# Patient Record
Sex: MALE | Race: WHITE | Employment: OTHER | URBAN - METROPOLITAN AREA
[De-identification: names, ages, dates, MRNs, and addresses within clinical notes are randomized per-mention and may not be internally consistent; named-entity substitution may affect disease eponyms.]

---

## 2023-06-09 LAB
ALANINE AMINOTRANSFERASE (SGPT) (U/L) IN SER/PLAS: 21 U/L (ref 10–52)
ALBUMIN (G/DL) IN SER/PLAS: 4.5 G/DL (ref 3.4–5)
ALKALINE PHOSPHATASE (U/L) IN SER/PLAS: 39 U/L (ref 33–136)
ANION GAP IN SER/PLAS: 12 MMOL/L (ref 10–20)
ASPARTATE AMINOTRANSFERASE (SGOT) (U/L) IN SER/PLAS: 21 U/L (ref 9–39)
BILIRUBIN TOTAL (MG/DL) IN SER/PLAS: 0.6 MG/DL (ref 0–1.2)
CALCIUM (MG/DL) IN SER/PLAS: 9.7 MG/DL (ref 8.6–10.6)
CARBON DIOXIDE, TOTAL (MMOL/L) IN SER/PLAS: 26 MMOL/L (ref 21–32)
CHLORIDE (MMOL/L) IN SER/PLAS: 107 MMOL/L (ref 98–107)
CHOLESTEROL (MG/DL) IN SER/PLAS: 143 MG/DL (ref 0–199)
CHOLESTEROL IN HDL (MG/DL) IN SER/PLAS: 37.9 MG/DL
CHOLESTEROL/HDL RATIO: 3.8
CREATININE (MG/DL) IN SER/PLAS: 1.02 MG/DL (ref 0.5–1.3)
ERYTHROCYTE DISTRIBUTION WIDTH (RATIO) BY AUTOMATED COUNT: 13.1 % (ref 11.5–14.5)
ERYTHROCYTE MEAN CORPUSCULAR HEMOGLOBIN CONCENTRATION (G/DL) BY AUTOMATED: 32.8 G/DL (ref 32–36)
ERYTHROCYTE MEAN CORPUSCULAR VOLUME (FL) BY AUTOMATED COUNT: 105 FL (ref 80–100)
ERYTHROCYTES (10*6/UL) IN BLOOD BY AUTOMATED COUNT: 4.57 X10E12/L (ref 4.5–5.9)
ESTIMATED AVERAGE GLUCOSE FOR HBA1C: 111 MG/DL
GFR MALE: 77 ML/MIN/1.73M2
GLUCOSE (MG/DL) IN SER/PLAS: 92 MG/DL (ref 74–99)
HEMATOCRIT (%) IN BLOOD BY AUTOMATED COUNT: 47.8 % (ref 41–52)
HEMOGLOBIN (G/DL) IN BLOOD: 15.7 G/DL (ref 13.5–17.5)
HEMOGLOBIN A1C/HEMOGLOBIN TOTAL IN BLOOD: 5.5 %
LDL: 81 MG/DL (ref 0–99)
LEUKOCYTES (10*3/UL) IN BLOOD BY AUTOMATED COUNT: 7.5 X10E9/L (ref 4.4–11.3)
NRBC (PER 100 WBCS) BY AUTOMATED COUNT: 0 /100 WBC (ref 0–0)
PLATELETS (10*3/UL) IN BLOOD AUTOMATED COUNT: 243 X10E9/L (ref 150–450)
POTASSIUM (MMOL/L) IN SER/PLAS: 4.9 MMOL/L (ref 3.5–5.3)
PROTEIN TOTAL: 7.3 G/DL (ref 6.4–8.2)
SODIUM (MMOL/L) IN SER/PLAS: 140 MMOL/L (ref 136–145)
TRIGLYCERIDE (MG/DL) IN SER/PLAS: 120 MG/DL (ref 0–149)
UREA NITROGEN (MG/DL) IN SER/PLAS: 18 MG/DL (ref 6–23)
VLDL: 24 MG/DL (ref 0–40)

## 2023-11-13 LAB
NON-UH HIE A/G RATIO: 1.3
NON-UH HIE ALB: 4.1 G/DL (ref 3.4–5)
NON-UH HIE ALK PHOS: 38 UNIT/L (ref 45–117)
NON-UH HIE BILIRUBIN, TOTAL: 0.8 MG/DL (ref 0.3–1.2)
NON-UH HIE BUN/CREAT RATIO: 14.5
NON-UH HIE BUN: 16 MG/DL (ref 9–23)
NON-UH HIE CALCIUM: 9.8 MG/DL (ref 8.7–10.4)
NON-UH HIE CALCULATED OSMOLALITY: 280 MOSM/KG (ref 275–295)
NON-UH HIE CHLORIDE: 105 MMOL/L (ref 98–107)
NON-UH HIE CO2, VENOUS: 28 MMOL/L (ref 20–31)
NON-UH HIE CREATININE: 1.1 MG/DL (ref 0.6–1.1)
NON-UH HIE GFR AA: >60
NON-UH HIE GLOBULIN: 3.1 G/DL
NON-UH HIE GLOMERULAR FILTRATION RATE: >60 ML/MIN/1.73M?
NON-UH HIE GLUCOSE: 90 MG/DL (ref 74–106)
NON-UH HIE GOT: 25 UNIT/L (ref 15–37)
NON-UH HIE GPT: 28 UNIT/L (ref 10–49)
NON-UH HIE K: 4.9 MMOL/L (ref 3.5–5.1)
NON-UH HIE NA: 140 MMOL/L (ref 135–145)
NON-UH HIE PROSTATIC SPECIFIC ANTIGEN: 1.4 NG/ML (ref 0–4)
NON-UH HIE TOTAL PROTEIN: 7.2 G/DL (ref 5.7–8.2)
NON-UH HIE VITAMIN B12: 422 PG/ML (ref 211–911)

## 2023-11-16 ENCOUNTER — TELEPHONE (OUTPATIENT)
Dept: PRIMARY CARE | Facility: CLINIC | Age: 73
End: 2023-11-16
Payer: MEDICARE

## 2023-11-16 NOTE — TELEPHONE ENCOUNTER
----- Message from Edith Perdue MD sent at 11/13/2023  4:38 PM EST -----  Potassium, glucose, liver tests, vitamin b12, and PSA within goal. Will review labs at next appt

## 2023-11-17 ENCOUNTER — TELEPHONE (OUTPATIENT)
Dept: PRIMARY CARE | Facility: CLINIC | Age: 73
End: 2023-11-17
Payer: MEDICARE

## 2023-11-17 NOTE — TELEPHONE ENCOUNTER
----- Message from Edith Perdue MD sent at 11/17/2023 12:47 PM EST -----  No thoracic aneurysm on CT. Changes from emphysema but has known history of emphysema. Also looks like fatty liver. Recommend weight loss to help with this. No nodule seen. These is some scarring right lung unchanged from prior.

## 2023-11-20 NOTE — TELEPHONE ENCOUNTER
----- Message from Edith Perdue MD sent at 11/19/2023  7:20 PM EST -----  CT abdomen shows postoperative changes of the distal aorta. Stable from imaging last year. Also shows fatty liver.

## 2023-11-27 DIAGNOSIS — I71.40 ABDOMINAL AORTIC ANEURYSM (AAA) WITHOUT RUPTURE, UNSPECIFIED PART (CMS-HCC): ICD-10-CM

## 2023-11-27 PROBLEM — D75.89 MACROCYTOSIS: Status: ACTIVE | Noted: 2023-11-27

## 2023-11-27 PROBLEM — J44.9 CHRONIC OBSTRUCTIVE PULMONARY DISEASE (MULTI): Status: ACTIVE | Noted: 2023-11-27

## 2023-11-27 PROBLEM — I10 HYPERTENSION: Status: ACTIVE | Noted: 2023-11-27

## 2023-11-27 PROBLEM — R06.02 SHORTNESS OF BREATH ON EXERTION: Status: ACTIVE | Noted: 2023-11-27

## 2023-11-27 PROBLEM — R35.1 NOCTURIA: Status: ACTIVE | Noted: 2023-11-27

## 2023-11-27 PROBLEM — R73.9 HYPERGLYCEMIA: Status: ACTIVE | Noted: 2023-11-27

## 2023-11-27 PROBLEM — I71.21 ANEURYSM OF ASCENDING AORTA (CMS-HCC): Status: ACTIVE | Noted: 2023-11-27

## 2023-11-27 RX ORDER — PRAVASTATIN SODIUM 20 MG/1
20 TABLET ORAL NIGHTLY
Qty: 90 TABLET | Refills: 3 | Status: SHIPPED | OUTPATIENT
Start: 2023-11-27

## 2023-11-27 RX ORDER — CETIRIZINE HYDROCHLORIDE 10 MG/1
1 TABLET ORAL DAILY PRN
COMMUNITY

## 2023-11-27 RX ORDER — METOPROLOL TARTRATE 25 MG/1
12.5 TABLET, FILM COATED ORAL 2 TIMES DAILY
COMMUNITY

## 2023-11-27 RX ORDER — EPINEPHRINE 0.3 MG/.3ML
1 INJECTION INTRAMUSCULAR ONCE AS NEEDED
COMMUNITY
Start: 2015-04-22

## 2023-11-27 RX ORDER — SILDENAFIL 50 MG/1
50 TABLET, FILM COATED ORAL
COMMUNITY
Start: 2023-07-12

## 2023-11-27 RX ORDER — FLUTICASONE FUROATE, UMECLIDINIUM BROMIDE AND VILANTEROL TRIFENATATE 100; 62.5; 25 UG/1; UG/1; UG/1
1 POWDER RESPIRATORY (INHALATION) DAILY
COMMUNITY
Start: 2023-06-06 | End: 2023-12-14 | Stop reason: WASHOUT

## 2023-11-27 RX ORDER — VALSARTAN 160 MG/1
160 TABLET ORAL DAILY
COMMUNITY
End: 2023-12-08

## 2023-11-27 RX ORDER — OXYMETAZOLINE HYDROCHLORIDE 0.05 G/100ML
1 SPRAY NASAL 2 TIMES DAILY
COMMUNITY

## 2023-11-27 RX ORDER — ASPIRIN 81 MG/1
81 TABLET ORAL DAILY
COMMUNITY

## 2023-11-27 RX ORDER — PRAVASTATIN SODIUM 20 MG/1
20 TABLET ORAL NIGHTLY
COMMUNITY
End: 2023-11-27 | Stop reason: SDUPTHER

## 2023-12-08 DIAGNOSIS — I10 PRIMARY HYPERTENSION: Primary | ICD-10-CM

## 2023-12-08 RX ORDER — VALSARTAN 160 MG/1
160 TABLET ORAL DAILY
Qty: 90 TABLET | Refills: 3 | Status: SHIPPED | OUTPATIENT
Start: 2023-12-08

## 2023-12-13 PROBLEM — I71.21 ANEURYSM OF ASCENDING AORTA (CMS-HCC): Status: RESOLVED | Noted: 2023-11-27 | Resolved: 2023-12-13

## 2023-12-13 RX ORDER — ALBUTEROL SULFATE 90 UG/1
AEROSOL, METERED RESPIRATORY (INHALATION)
COMMUNITY

## 2023-12-14 ENCOUNTER — OFFICE VISIT (OUTPATIENT)
Dept: PRIMARY CARE | Facility: CLINIC | Age: 73
End: 2023-12-14
Payer: MEDICARE

## 2023-12-14 ENCOUNTER — OFFICE VISIT (OUTPATIENT)
Dept: CARDIOLOGY | Facility: CLINIC | Age: 73
End: 2023-12-14
Payer: MEDICARE

## 2023-12-14 VITALS
SYSTOLIC BLOOD PRESSURE: 110 MMHG | TEMPERATURE: 97.8 F | WEIGHT: 233 LBS | HEIGHT: 68 IN | BODY MASS INDEX: 35.31 KG/M2 | HEART RATE: 78 BPM | OXYGEN SATURATION: 91 % | DIASTOLIC BLOOD PRESSURE: 70 MMHG

## 2023-12-14 VITALS
WEIGHT: 227 LBS | SYSTOLIC BLOOD PRESSURE: 110 MMHG | DIASTOLIC BLOOD PRESSURE: 72 MMHG | HEART RATE: 71 BPM | TEMPERATURE: 97.4 F | HEIGHT: 68 IN | BODY MASS INDEX: 34.4 KG/M2

## 2023-12-14 DIAGNOSIS — I71.43 INFRARENAL ABDOMINAL AORTIC ANEURYSM (AAA) WITHOUT RUPTURE (CMS-HCC): Primary | ICD-10-CM

## 2023-12-14 DIAGNOSIS — J43.9 PULMONARY EMPHYSEMA, UNSPECIFIED EMPHYSEMA TYPE (MULTI): ICD-10-CM

## 2023-12-14 DIAGNOSIS — I71.40 ABDOMINAL AORTIC ANEURYSM (AAA) WITHOUT RUPTURE, UNSPECIFIED PART (CMS-HCC): ICD-10-CM

## 2023-12-14 DIAGNOSIS — E66.01 OBESITY, MORBID (MULTI): ICD-10-CM

## 2023-12-14 DIAGNOSIS — I10 PRIMARY HYPERTENSION: ICD-10-CM

## 2023-12-14 DIAGNOSIS — I10 HYPERTENSION, UNSPECIFIED TYPE: ICD-10-CM

## 2023-12-14 DIAGNOSIS — K76.0 FATTY LIVER: Primary | ICD-10-CM

## 2023-12-14 PROCEDURE — 3078F DIAST BP <80 MM HG: CPT | Performed by: INTERNAL MEDICINE

## 2023-12-14 PROCEDURE — 1157F ADVNC CARE PLAN IN RCRD: CPT | Performed by: INTERNAL MEDICINE

## 2023-12-14 PROCEDURE — 1159F MED LIST DOCD IN RCRD: CPT | Performed by: INTERNAL MEDICINE

## 2023-12-14 PROCEDURE — 99214 OFFICE O/P EST MOD 30 MIN: CPT | Performed by: INTERNAL MEDICINE

## 2023-12-14 PROCEDURE — 1160F RVW MEDS BY RX/DR IN RCRD: CPT | Performed by: INTERNAL MEDICINE

## 2023-12-14 PROCEDURE — 3074F SYST BP LT 130 MM HG: CPT | Performed by: INTERNAL MEDICINE

## 2023-12-14 PROCEDURE — 1126F AMNT PAIN NOTED NONE PRSNT: CPT | Performed by: INTERNAL MEDICINE

## 2023-12-14 PROCEDURE — 1036F TOBACCO NON-USER: CPT | Performed by: INTERNAL MEDICINE

## 2023-12-14 RX ORDER — ALBUTEROL SULFATE 0.83 MG/ML
2.5 SOLUTION RESPIRATORY (INHALATION) EVERY 4 HOURS PRN
COMMUNITY

## 2023-12-14 RX ORDER — TIOTROPIUM BROMIDE AND OLODATEROL 3.124; 2.736 UG/1; UG/1
2 SPRAY, METERED RESPIRATORY (INHALATION) DAILY
COMMUNITY
Start: 2023-11-28

## 2023-12-14 ASSESSMENT — PAIN SCALES - GENERAL
PAINLEVEL: 0-NO PAIN
PAINLEVEL: 0-NO PAIN

## 2023-12-14 ASSESSMENT — PATIENT HEALTH QUESTIONNAIRE - PHQ9
1. LITTLE INTEREST OR PLEASURE IN DOING THINGS: NOT AT ALL
2. FEELING DOWN, DEPRESSED OR HOPELESS: NOT AT ALL
SUM OF ALL RESPONSES TO PHQ9 QUESTIONS 1 & 2: 0

## 2023-12-14 NOTE — PROGRESS NOTES
1. AAA repair 2018  2. Hypertension  3. Hyperlipidemia  4. Emphysema  5. Overweight, BMI 35.1    Patient is a pleasant 73-year-old male with the above noted pertinent past medical history who presents today for follow-up.  He has no complaints of exertional chest pain or shortness of breath when questioned regarding activity, his level are low particularly in the winter months, he states that he enjoys the outdoors playing golf soon wintertime particularly with his emphysema is difficult for him, however he plans on going to the gymnasium for walking regimen he, he has no complaints of orthopnea PND palpitation or syncopal episode    Vital signs reviewed and stable BMI is 34.5 previously at 34.2, heart rate of 71 bpm and blood pressure 110/72 mmHg moderately obese male in no acute distress speaking full sentences no carotid bruits upstroke and volumes and central venous pressures are normal, heart rate and rhythm are regular S1-S2 are normal no gallop or murmurs are appreciated lungs decreased breath sound but clear abdomen is obese positive bowel sounds soft and nontender    November 18, 2023  CT examination of the abdomen and chest were performed with no aneurysmal disease aorta and in the chest abdomen status post aneurysmal repair  Total cholesterol 143, triglyceride 120, HDL 38, and LDL of 81  Normal renal function BUN of 18 and creatinine of 1.02    73-year-old male with aneurysmal infrarenal aorta with resection and followed serially by vascular surgery, patient has no aneurysmal disease of the thoracic aorta,  Hypertension under good control  Hyperlipidemia under good control  Overweight the importance of heart healthy diet weight loss and activity was discussed and details provided  Patient is to return to my clinic in 6 months for follow-up.

## 2023-12-14 NOTE — PROGRESS NOTES
"Chief Complaint   Patient presents with    Follow-up     Pt here for 5 mo FUV.         73 year old man presents for 5 month follow up  Last visit 7/2023. Brought him living will.  Doing ok. Typical aches and pains \"old age\"  No chest pain.  Has appt with Dr Vogel today    Past Medical History  AAA surgery 2/2018 surgeon Dr Corrales. sees Dr Vogel  per his note mild ascending aortic aneurysm.   Bee sting allergy-carries EPI pen  Right renal pelvis: left renal cyst 12/2017 and CT 2023  Former smoker-screen Ct yearly due Sept  Hearing loss: has earing aides  HTN  Emphysema Dr Quach. pfts 4/2022 severe  Fatty liver     Social history  quit smoking age 55.>1 ppd x 40 years  , has children  worked at [employer] (no safety guidelines in place at that time)     Blood pressure 110/70, pulse 78, temperature 36.6 °C (97.8 °F), height 1.727 m (5' 8\"), weight 106 kg (233 lb), SpO2 91 %.    Vital reviewed  Neck: no cervical/clavicular adenopathy  CV: RRR S1 S2 normal. No murmur  Lungs: CTA without wrr. Breath sounds symmetric  Abdomen: normoactive. Soft, nontender. No mass.  Extremities: no pretibial edema  Neuro: speech intact.          Labs     11/2023 cmp, b12, psa  Cr 1.1 glucose 90 K 4.9 liver function tests negative  B12 422  PSA 1.4 normal    7/2023 a1c, cbc a1c 5.4 hg 14.8 mcv 102  6/2023 cmp, llipid, a1c  a1c 5.5   143/37/81/120 cr 1.02 lfts negative glucose 92     10/2022 a1c, b12, cbc, cmp, lipid, psa  A1c 5.5 normal  b12 397 normal   glucose elevated 102 cr 1.0 liver function tests negative  157/41/90/132  PSA 1.32 normal.      4/2022 cmp, b12, bmp, a1c.     10/2021 cbc, PSA, lipid, cmp, A1c     9/2022 CT lung ca screen  emphysema.no significant nodules. atherosclerosis.      9/2021 CT lung screen  decreased size of 5 mm nodule. other small insignificant nodules. radiologist recommends repeat in 1 year.    11/2023 CT angio abdomen: postoperative change, fatty liver  11/2023 CT angio chest no evidence " of thoracic aortic aneurysm.      ASSESSMENT AND PLAN:  s/p AAA surgery 2018. Reviewed ct angio abd and chest. No aneurysm  COPD denies symptoms at this time  Fatty liver discussed potential liver damage. Advised GI for fibroscan      Former smoker 40 pack years: Ct 11/2023  PSA 11/2023  F/up 6 months and prn

## 2023-12-20 ENCOUNTER — TELEPHONE (OUTPATIENT)
Dept: PRIMARY CARE | Facility: CLINIC | Age: 73
End: 2023-12-20
Payer: MEDICARE

## 2023-12-20 NOTE — TELEPHONE ENCOUNTER
----- Message from Oli Virk sent at 12/8/2023  2:08 PM EST -----  Regarding: Valsartan 160 mg Tablets needs refilled - 90 day supply  Contact: [phone]  Jyoti at [facility] in [place] is waiting for approval from Dr. Vogel to fill a prescription for Tian Virk., Please contact Jyoti at [phone] [address] ( [place] of [address] & [facility]

## 2024-06-13 ENCOUNTER — LAB (OUTPATIENT)
Dept: LAB | Facility: LAB | Age: 74
End: 2024-06-13
Payer: MEDICARE

## 2024-06-13 DIAGNOSIS — I10 HYPERTENSION, UNSPECIFIED TYPE: ICD-10-CM

## 2024-06-13 LAB
ALBUMIN SERPL BCP-MCNC: 4.3 G/DL (ref 3.4–5)
ALP SERPL-CCNC: 33 U/L (ref 33–136)
ALT SERPL W P-5'-P-CCNC: 25 U/L (ref 10–52)
ANION GAP SERPL CALC-SCNC: 11 MMOL/L (ref 10–20)
AST SERPL W P-5'-P-CCNC: 21 U/L (ref 9–39)
BILIRUB SERPL-MCNC: 0.7 MG/DL (ref 0–1.2)
BUN SERPL-MCNC: 14 MG/DL (ref 6–23)
CALCIUM SERPL-MCNC: 9.2 MG/DL (ref 8.6–10.3)
CHLORIDE SERPL-SCNC: 104 MMOL/L (ref 98–107)
CHOLEST SERPL-MCNC: 151 MG/DL (ref 0–199)
CHOLESTEROL/HDL RATIO: 3.9
CO2 SERPL-SCNC: 25 MMOL/L (ref 21–32)
CREAT SERPL-MCNC: 0.95 MG/DL (ref 0.5–1.3)
EGFRCR SERPLBLD CKD-EPI 2021: 84 ML/MIN/1.73M*2
ERYTHROCYTE [DISTWIDTH] IN BLOOD BY AUTOMATED COUNT: 13.6 % (ref 11.5–14.5)
GLUCOSE SERPL-MCNC: 113 MG/DL (ref 74–99)
HCT VFR BLD AUTO: 46.3 % (ref 41–52)
HDLC SERPL-MCNC: 38.4 MG/DL
HGB BLD-MCNC: 15.2 G/DL (ref 13.5–17.5)
LDLC SERPL CALC-MCNC: 75 MG/DL
MCH RBC QN AUTO: 34.2 PG (ref 26–34)
MCHC RBC AUTO-ENTMCNC: 32.8 G/DL (ref 32–36)
MCV RBC AUTO: 104 FL (ref 80–100)
NON HDL CHOLESTEROL: 113 MG/DL (ref 0–149)
NRBC BLD-RTO: 0 /100 WBCS (ref 0–0)
PLATELET # BLD AUTO: 181 X10*3/UL (ref 150–450)
POTASSIUM SERPL-SCNC: 4.4 MMOL/L (ref 3.5–5.3)
PROT SERPL-MCNC: 6.7 G/DL (ref 6.4–8.2)
RBC # BLD AUTO: 4.45 X10*6/UL (ref 4.5–5.9)
SODIUM SERPL-SCNC: 136 MMOL/L (ref 136–145)
TRIGL SERPL-MCNC: 186 MG/DL (ref 0–149)
VLDL: 37 MG/DL (ref 0–40)
WBC # BLD AUTO: 6 X10*3/UL (ref 4.4–11.3)

## 2024-06-13 PROCEDURE — 80061 LIPID PANEL: CPT

## 2024-06-13 PROCEDURE — 80053 COMPREHEN METABOLIC PANEL: CPT

## 2024-06-13 PROCEDURE — 85027 COMPLETE CBC AUTOMATED: CPT

## 2024-06-20 ENCOUNTER — APPOINTMENT (OUTPATIENT)
Dept: PRIMARY CARE | Facility: CLINIC | Age: 74
End: 2024-06-20
Payer: MEDICARE

## 2024-06-20 ENCOUNTER — APPOINTMENT (OUTPATIENT)
Dept: CARDIOLOGY | Facility: CLINIC | Age: 74
End: 2024-06-20
Payer: MEDICARE

## 2024-06-20 VITALS
TEMPERATURE: 98.2 F | WEIGHT: 183 LBS | BODY MASS INDEX: 27.74 KG/M2 | DIASTOLIC BLOOD PRESSURE: 66 MMHG | SYSTOLIC BLOOD PRESSURE: 112 MMHG | HEART RATE: 71 BPM | HEIGHT: 68 IN

## 2024-06-20 VITALS
HEIGHT: 68 IN | OXYGEN SATURATION: 91 % | HEART RATE: 71 BPM | SYSTOLIC BLOOD PRESSURE: 110 MMHG | WEIGHT: 232.2 LBS | TEMPERATURE: 98.2 F | BODY MASS INDEX: 35.19 KG/M2 | DIASTOLIC BLOOD PRESSURE: 58 MMHG

## 2024-06-20 DIAGNOSIS — I10 PRIMARY HYPERTENSION: Primary | ICD-10-CM

## 2024-06-20 DIAGNOSIS — R06.02 SHORTNESS OF BREATH ON EXERTION: ICD-10-CM

## 2024-06-20 DIAGNOSIS — I10 HYPERTENSION, UNSPECIFIED TYPE: ICD-10-CM

## 2024-06-20 DIAGNOSIS — R73.9 HYPERGLYCEMIA: ICD-10-CM

## 2024-06-20 DIAGNOSIS — R35.1 NOCTURIA: ICD-10-CM

## 2024-06-20 DIAGNOSIS — Z12.5 SCREENING FOR PROSTATE CANCER: ICD-10-CM

## 2024-06-20 DIAGNOSIS — Z91.030 ALLERGIC TO BEES: Primary | ICD-10-CM

## 2024-06-20 DIAGNOSIS — Z11.59 ENCOUNTER FOR HEPATITIS C SCREENING TEST FOR LOW RISK PATIENT: ICD-10-CM

## 2024-06-20 DIAGNOSIS — E66.01 OBESITY, MORBID (MULTI): ICD-10-CM

## 2024-06-20 PROCEDURE — 3078F DIAST BP <80 MM HG: CPT | Performed by: INTERNAL MEDICINE

## 2024-06-20 PROCEDURE — 1170F FXNL STATUS ASSESSED: CPT | Performed by: INTERNAL MEDICINE

## 2024-06-20 PROCEDURE — 99214 OFFICE O/P EST MOD 30 MIN: CPT | Performed by: INTERNAL MEDICINE

## 2024-06-20 PROCEDURE — G0439 PPPS, SUBSEQ VISIT: HCPCS | Performed by: INTERNAL MEDICINE

## 2024-06-20 PROCEDURE — 1159F MED LIST DOCD IN RCRD: CPT | Performed by: INTERNAL MEDICINE

## 2024-06-20 PROCEDURE — 1158F ADVNC CARE PLAN TLK DOCD: CPT | Performed by: INTERNAL MEDICINE

## 2024-06-20 PROCEDURE — 1123F ACP DISCUSS/DSCN MKR DOCD: CPT | Performed by: INTERNAL MEDICINE

## 2024-06-20 PROCEDURE — 1036F TOBACCO NON-USER: CPT | Performed by: INTERNAL MEDICINE

## 2024-06-20 PROCEDURE — 3074F SYST BP LT 130 MM HG: CPT | Performed by: INTERNAL MEDICINE

## 2024-06-20 PROCEDURE — 1157F ADVNC CARE PLAN IN RCRD: CPT | Performed by: INTERNAL MEDICINE

## 2024-06-20 PROCEDURE — 99213 OFFICE O/P EST LOW 20 MIN: CPT | Performed by: INTERNAL MEDICINE

## 2024-06-20 PROCEDURE — 99397 PER PM REEVAL EST PAT 65+ YR: CPT | Performed by: INTERNAL MEDICINE

## 2024-06-20 PROCEDURE — 1126F AMNT PAIN NOTED NONE PRSNT: CPT | Performed by: INTERNAL MEDICINE

## 2024-06-20 RX ORDER — EPINEPHRINE 0.3 MG/.3ML
1 INJECTION INTRAMUSCULAR ONCE AS NEEDED
Qty: 1 EACH | Refills: 3 | Status: SHIPPED | OUTPATIENT
Start: 2024-06-20

## 2024-06-20 ASSESSMENT — ACTIVITIES OF DAILY LIVING (ADL)
BATHING: INDEPENDENT
DOING_HOUSEWORK: INDEPENDENT
GROCERY_SHOPPING: INDEPENDENT
TAKING_MEDICATION: INDEPENDENT
DRESSING: INDEPENDENT
MANAGING_FINANCES: INDEPENDENT

## 2024-06-20 ASSESSMENT — PAIN SCALES - GENERAL: PAINLEVEL: 0-NO PAIN

## 2024-06-20 ASSESSMENT — LIFESTYLE VARIABLES
SKIP TO QUESTIONS 9-10: 1
HOW MANY STANDARD DRINKS CONTAINING ALCOHOL DO YOU HAVE ON A TYPICAL DAY: 1 OR 2
HOW OFTEN DO YOU HAVE A DRINK CONTAINING ALCOHOL: 4 OR MORE TIMES A WEEK
AUDIT-C TOTAL SCORE: 4
HOW OFTEN DO YOU HAVE SIX OR MORE DRINKS ON ONE OCCASION: NEVER

## 2024-06-20 ASSESSMENT — PATIENT HEALTH QUESTIONNAIRE - PHQ9
SUM OF ALL RESPONSES TO PHQ9 QUESTIONS 1 AND 2: 0
2. FEELING DOWN, DEPRESSED OR HOPELESS: NOT AT ALL
1. LITTLE INTEREST OR PLEASURE IN DOING THINGS: NOT AT ALL

## 2024-06-20 NOTE — PROGRESS NOTES
1. AAA repair 2018  2. Hypertension  3. Hyperlipidemia  4. Emphysema  5. Overweight, BMI 35.1    Patient is a pleasant 74-year-old male with the above-noted pertinent past medical history who presents today for follow-up.  He has no complaints of exertional chest pain or shortness of breath he is level of activity due to his underlying emphysema is limited, he has no complaints of orthopnea PND palpitation or syncopal episode    BMI of 27.8, previously at 34.5, blood pressure is acceptable 110/58 mmHg and heart rate of 71 bpm patient is a well-developed well-nourished male in no acute distress speaking full sentences no carotid bruits upstroke volumes are normal heart rate and rhythm are regular S1 and S2 are normal intensity no gallop murmurs or rubs appreciated lungs severely decreased breath sound but clear, abdomen is obese positive bowel sounds soft and nontender    June 2024  Total cholesterol 151, triglyceride 186, HDL 38, and LDL of 75  Hemoglobin 15.2 hematocrit of 46.3  Sodium 136, potassium 4.4, BUN 14, creatinine 0.95 with normal liver transaminases    AAA repair follow-up by vascular surgery  Hypertension under good control  Hyperlipidemia his latest cholesterol profile was reviewed and discussed with him the increase in triglyceride was noted the reduction in carbohydrate use was discussed  Overweight was congratulated on his current weight loss and encouraged to continue  Return to my clinic in 6 months.

## 2024-06-20 NOTE — PROGRESS NOTES
"Chief Complaint   Patient presents with    Medicare Annual Wellness Visit Subsequent    Follow-up     Pt here for AWV and 6 mo FUV       74 y.o. for AWV and 6 month follow up.   Last visit 12/2023 weight fluctuating 222-227.  Patient denies chest pain, pressure, palpitations.   Breathing ok if not exerting.   Abdominal cramps. Started tracking food and seemed to be from milk and dairy.       Past Medical History  AAA surgery 2/2018 surgeon Dr Corrales. sees Dr Vogel  per his note mild ascending aortic aneurysm.   Bee sting allergy-carries EPI pen  Right renal pelvis: left renal cyst 12/2017 and CT 2023  Former smoker-screen Ct yearly due Sept  Hearing loss: has earing aides  HTN  Emphysema Dr Quach. pfts 4/2022 severe  Fatty liver     Social history  quit smoking age 55.>1 ppd x 40 years  , has children  worked at [employer] (no safety guidelines in place at that time)       Blood pressure 110/58, pulse 71, temperature 36.8 °C (98.2 °F), height 1.727 m (5' 8\"), weight 105 kg (232 lb 3.2 oz), SpO2 91%.  Body mass index is 35.31 kg/m².    Physical Examination  General: NAD. Alert.   HEENT: Normocephalic atraumatic.    Eyes: no scleral icterus. Extraocular movements intact.  Pupils equal round and reactive to light.  Ears: TM intact.  No cerumen. Hearing grossly intact.   Throat: No exudate  Neck:  Supple. No thyroid goiter.  Lymph nodes: No cervical or clavicular adenopathy  Cardiovascular: Regular rate rhythm S1-S2 normal no murmur. No carotid bruit.   Lungs: Clear to Auscultation without wheezing, rales, or rhonchi, Breath sounds symmetric. No use of accessory muscles  Abdomen:  Normoactive bowel sounds, soft, non-tender. No mass.  Limited exam  Extremities: No pretibial edema  Neuro: no facial asymmetry. Strength upper and lower extremities 5/5. Sensation intact to light touch. No tremor. Babinski negative  Skin: no rash noted  Vascular:  no cyanosis. Capillary refill 1-2 seconds both feet.    Lab " "Results   Component Value Date    HGBA1C 5.5 06/09/2023     Lab Results   Component Value Date    GLUCOSE 113 (H) 06/13/2024    CALCIUM 9.2 06/13/2024     06/13/2024    K 4.4 06/13/2024    CO2 25 06/13/2024     06/13/2024    BUN 14 06/13/2024    CREATININE 0.95 06/13/2024     Lab Results   Component Value Date    ALT 25 06/13/2024    AST 21 06/13/2024    ALKPHOS 33 06/13/2024    BILITOT 0.7 06/13/2024     Lab Results   Component Value Date    WBC 6.0 06/13/2024    HGB 15.2 06/13/2024    HCT 46.3 06/13/2024     (H) 06/13/2024     06/13/2024     Lab Results   Component Value Date    CHOL 151 06/13/2024    CHOL 143 06/09/2023    CHOL 157 10/25/2022     Lab Results   Component Value Date    HDL 38.4 06/13/2024    HDL 37.9 (A) 06/09/2023    HDL 41.0 10/25/2022     Lab Results   Component Value Date    LDLCALC 75 06/13/2024     Lab Results   Component Value Date    TRIG 186 (H) 06/13/2024    TRIG 120 06/09/2023    TRIG 132 10/25/2022     No components found for: \"CHOLHDL\"      ASSESSMENT/PLAN  AWV  Living Will: has a living will, have a copy in the chart.   Cognition/Memory if 65 or above  Hepatitis C (18-79) future order  HIV (18-64, once: n/a  Men: PSA (Age >50)  Colon cancer screening 45 and older:  Immunization: discussed covid and flu vaccine in the fall.     1. Allergic to bees  - EPINEPHrine (EpiPen 2-Matt) 0.3 mg/0.3 mL injection syringe; Inject 0.3 mL (0.3 mg) into the muscle 1 time if needed for anaphylaxis.  Dispense: 1 each; Refill: 3    2. Nocturia    3. Hyperglycemia  Non-fasting last time he had labs. Future A1c. Ok to eat prior to next labs.  - CBC; Future  - Comprehensive Metabolic Panel; Future  - Hemoglobin A1C; Future    4. Hypertension, unspecified type  Well controlled,asymptomatic  - CBC; Future  - Comprehensive Metabolic Panel; Future    5. Screening for prostate cancer    - Prostate Specific Antigen, Screen; Future    6. Encounter for hepatitis C screening test for low " risk patient  - Hepatitis C antibody; Future    Future labs        Current Outpatient Medications on File Prior to Visit   Medication Sig Dispense Refill    albuterol 2.5 mg /3 mL (0.083 %) nebulizer solution Take 3 mL (2.5 mg) by nebulization every 4 hours if needed for wheezing.      albuterol 90 mcg/actuation inhaler INHALE 1 TO 2 PUFFS BY MOUTH EVERY 4 TO 6 HOURS AS NEEDED      aspirin 81 mg EC tablet Take 1 tablet (81 mg) by mouth once daily.      cetirizine (ZyrTEC) 10 mg tablet Take 1 tablet (10 mg) by mouth once daily as needed.      EPINEPHrine (EpiPen 2-Matt) 0.3 mg/0.3 mL injection syringe Inject 0.3 mL (0.3 mg) into the muscle 1 time if needed.      metoprolol tartrate (Lopressor) 25 mg tablet Take 0.5 tablets (12.5 mg) by mouth 2 times a day.      oxymetazoline (Afrin, oxymetazoline,) 0.05 % nasal spray Administer 1 spray into each nostril 2 times a day.      pravastatin (Pravachol) 20 mg tablet Take 1 tablet (20 mg) by mouth once daily at bedtime. 90 tablet 3    sildenafil (Viagra) 50 mg tablet Take 1 tablet (50 mg) by mouth. TAKE ONE TABLET BY MOUTH DAILY 1 HOUR BEFORE NEEDED      Stiolto Respimat 2.5-2.5 mcg/actuation mist inhaler Inhale 2 Inhalations once daily.      valsartan (Diovan) 160 mg tablet TAKE 1 TABLET BY MOUTH DAILY 90 tablet 3     No current facility-administered medications on file prior to visit.

## 2024-10-29 DIAGNOSIS — I10 HYPERTENSION, UNSPECIFIED TYPE: Primary | ICD-10-CM

## 2024-10-29 RX ORDER — METOPROLOL TARTRATE 25 MG/1
12.5 TABLET, FILM COATED ORAL 2 TIMES DAILY
Qty: 180 TABLET | Refills: 3 | Status: SHIPPED | OUTPATIENT
Start: 2024-10-29

## 2024-11-18 DIAGNOSIS — I71.40 ABDOMINAL AORTIC ANEURYSM (AAA) WITHOUT RUPTURE, UNSPECIFIED PART (CMS-HCC): ICD-10-CM

## 2024-11-19 RX ORDER — PRAVASTATIN SODIUM 20 MG/1
20 TABLET ORAL NIGHTLY
Qty: 90 TABLET | Refills: 3 | Status: SHIPPED | OUTPATIENT
Start: 2024-11-19

## 2024-12-02 DIAGNOSIS — I10 PRIMARY HYPERTENSION: ICD-10-CM

## 2024-12-02 RX ORDER — VALSARTAN 160 MG/1
160 TABLET ORAL DAILY
Qty: 90 TABLET | Refills: 0 | Status: SHIPPED | OUTPATIENT
Start: 2024-12-02 | End: 2025-03-02

## 2024-12-03 ENCOUNTER — APPOINTMENT (OUTPATIENT)
Dept: PRIMARY CARE | Facility: CLINIC | Age: 74
End: 2024-12-03
Payer: MEDICARE

## 2024-12-03 ENCOUNTER — APPOINTMENT (OUTPATIENT)
Dept: CARDIOLOGY | Facility: CLINIC | Age: 74
End: 2024-12-03
Payer: MEDICARE

## 2024-12-03 ENCOUNTER — LAB (OUTPATIENT)
Dept: LAB | Facility: LAB | Age: 74
End: 2024-12-03
Payer: MEDICARE

## 2024-12-03 DIAGNOSIS — R73.9 HYPERGLYCEMIA: ICD-10-CM

## 2024-12-03 DIAGNOSIS — Z12.5 SCREENING FOR PROSTATE CANCER: ICD-10-CM

## 2024-12-03 DIAGNOSIS — Z11.59 ENCOUNTER FOR HEPATITIS C SCREENING TEST FOR LOW RISK PATIENT: ICD-10-CM

## 2024-12-03 DIAGNOSIS — I10 HYPERTENSION, UNSPECIFIED TYPE: ICD-10-CM

## 2024-12-03 LAB
ALBUMIN SERPL BCP-MCNC: 4.6 G/DL (ref 3.4–5)
ALP SERPL-CCNC: 33 U/L (ref 33–136)
ALT SERPL W P-5'-P-CCNC: 24 U/L (ref 10–52)
ANION GAP SERPL CALC-SCNC: 11 MMOL/L (ref 10–20)
AST SERPL W P-5'-P-CCNC: 22 U/L (ref 9–39)
BILIRUB SERPL-MCNC: 0.7 MG/DL (ref 0–1.2)
BUN SERPL-MCNC: 16 MG/DL (ref 6–23)
CALCIUM SERPL-MCNC: 9.3 MG/DL (ref 8.6–10.3)
CHLORIDE SERPL-SCNC: 104 MMOL/L (ref 98–107)
CO2 SERPL-SCNC: 29 MMOL/L (ref 21–32)
CREAT SERPL-MCNC: 1.04 MG/DL (ref 0.5–1.3)
EGFRCR SERPLBLD CKD-EPI 2021: 75 ML/MIN/1.73M*2
ERYTHROCYTE [DISTWIDTH] IN BLOOD BY AUTOMATED COUNT: 13.4 % (ref 11.5–14.5)
GLUCOSE SERPL-MCNC: 97 MG/DL (ref 74–99)
HCT VFR BLD AUTO: 46.8 % (ref 41–52)
HCV AB SER QL: NONREACTIVE
HGB BLD-MCNC: 15.7 G/DL (ref 13.5–17.5)
MCH RBC QN AUTO: 34.9 PG (ref 26–34)
MCHC RBC AUTO-ENTMCNC: 33.5 G/DL (ref 32–36)
MCV RBC AUTO: 104 FL (ref 80–100)
NRBC BLD-RTO: 0 /100 WBCS (ref 0–0)
PLATELET # BLD AUTO: 167 X10*3/UL (ref 150–450)
POTASSIUM SERPL-SCNC: 5.1 MMOL/L (ref 3.5–5.3)
PROT SERPL-MCNC: 7.1 G/DL (ref 6.4–8.2)
PSA SERPL-MCNC: 1.7 NG/ML
RBC # BLD AUTO: 4.5 X10*6/UL (ref 4.5–5.9)
SODIUM SERPL-SCNC: 139 MMOL/L (ref 136–145)
WBC # BLD AUTO: 6.5 X10*3/UL (ref 4.4–11.3)

## 2024-12-03 PROCEDURE — 83036 HEMOGLOBIN GLYCOSYLATED A1C: CPT

## 2024-12-03 PROCEDURE — 85027 COMPLETE CBC AUTOMATED: CPT

## 2024-12-03 PROCEDURE — 36415 COLL VENOUS BLD VENIPUNCTURE: CPT

## 2024-12-03 PROCEDURE — G0103 PSA SCREENING: HCPCS

## 2024-12-03 PROCEDURE — 80053 COMPREHEN METABOLIC PANEL: CPT

## 2024-12-03 PROCEDURE — G0472 HEP C SCREEN HIGH RISK/OTHER: HCPCS

## 2024-12-04 LAB
EST. AVERAGE GLUCOSE BLD GHB EST-MCNC: 114 MG/DL
HBA1C MFR BLD: 5.6 %

## 2024-12-05 ENCOUNTER — APPOINTMENT (OUTPATIENT)
Dept: PRIMARY CARE | Facility: CLINIC | Age: 74
End: 2024-12-05
Payer: MEDICARE

## 2024-12-05 ENCOUNTER — APPOINTMENT (OUTPATIENT)
Dept: CARDIOLOGY | Facility: CLINIC | Age: 74
End: 2024-12-05
Payer: MEDICARE

## 2024-12-05 VITALS
WEIGHT: 227 LBS | HEIGHT: 68 IN | SYSTOLIC BLOOD PRESSURE: 128 MMHG | BODY MASS INDEX: 34.4 KG/M2 | HEART RATE: 73 BPM | DIASTOLIC BLOOD PRESSURE: 72 MMHG | TEMPERATURE: 97.3 F

## 2024-12-05 VITALS
BODY MASS INDEX: 34.4 KG/M2 | HEART RATE: 73 BPM | TEMPERATURE: 97.4 F | WEIGHT: 227 LBS | HEIGHT: 68 IN | DIASTOLIC BLOOD PRESSURE: 72 MMHG | SYSTOLIC BLOOD PRESSURE: 128 MMHG

## 2024-12-05 DIAGNOSIS — I10 HYPERTENSION, UNSPECIFIED TYPE: Primary | ICD-10-CM

## 2024-12-05 DIAGNOSIS — I10 HYPERTENSION, UNSPECIFIED TYPE: ICD-10-CM

## 2024-12-05 DIAGNOSIS — R06.02 SHORTNESS OF BREATH ON EXERTION: ICD-10-CM

## 2024-12-05 DIAGNOSIS — I45.2 RIGHT BUNDLE BRANCH BLOCK (RBBB) WITH LEFT ANTERIOR FASCICULAR BLOCK (LAFB): ICD-10-CM

## 2024-12-05 DIAGNOSIS — R73.9 HYPERGLYCEMIA: Primary | ICD-10-CM

## 2024-12-05 DIAGNOSIS — D75.89 MACROCYTOSIS: ICD-10-CM

## 2024-12-05 PROCEDURE — 1036F TOBACCO NON-USER: CPT | Performed by: INTERNAL MEDICINE

## 2024-12-05 PROCEDURE — 3008F BODY MASS INDEX DOCD: CPT | Performed by: INTERNAL MEDICINE

## 2024-12-05 PROCEDURE — 1160F RVW MEDS BY RX/DR IN RCRD: CPT | Performed by: INTERNAL MEDICINE

## 2024-12-05 PROCEDURE — 99213 OFFICE O/P EST LOW 20 MIN: CPT | Performed by: INTERNAL MEDICINE

## 2024-12-05 PROCEDURE — 1157F ADVNC CARE PLAN IN RCRD: CPT | Performed by: INTERNAL MEDICINE

## 2024-12-05 PROCEDURE — 3074F SYST BP LT 130 MM HG: CPT | Performed by: INTERNAL MEDICINE

## 2024-12-05 PROCEDURE — 99214 OFFICE O/P EST MOD 30 MIN: CPT | Performed by: INTERNAL MEDICINE

## 2024-12-05 PROCEDURE — 3078F DIAST BP <80 MM HG: CPT | Performed by: INTERNAL MEDICINE

## 2024-12-05 PROCEDURE — 1159F MED LIST DOCD IN RCRD: CPT | Performed by: INTERNAL MEDICINE

## 2024-12-05 PROCEDURE — 93000 ELECTROCARDIOGRAM COMPLETE: CPT | Performed by: INTERNAL MEDICINE

## 2024-12-05 PROCEDURE — G2211 COMPLEX E/M VISIT ADD ON: HCPCS | Performed by: INTERNAL MEDICINE

## 2024-12-05 PROCEDURE — 1126F AMNT PAIN NOTED NONE PRSNT: CPT | Performed by: INTERNAL MEDICINE

## 2024-12-05 ASSESSMENT — PAIN SCALES - GENERAL: PAINLEVEL_OUTOF10: 0-NO PAIN

## 2024-12-05 NOTE — PROGRESS NOTES
"Chief Complaint   Patient presents with    Follow-up     Pt here for 6 mo FUV     74 y.o. for 6 month follow up.   Last visit 06/2024.  weight fluctuating 222-227.  Prior weight recorded in chart inaccurate  Saw cardiologist-states told may  need pacemaker in the future.   He is planning to talk to DR Quach about using oxygen prn for activities-did cardiac rehab in the past.       Past Medical History  AAA surgery 2/2018 surgeon Dr Corrales. sees Dr Cardozo  per his note mild ascending aortic aneurysm.   Bee sting allergy-carries EPI pen  Right renal pelvis: left renal cyst 12/2017 and CT 2023  Former smoker-screen Ct yearly due Sept  Hearing loss: has earing aides  HTN  Emphysema Dr Quach. pfts 4/2022 severe  Fatty liver     Social history  quit smoking age 55.>1 ppd x 40 years  , has children  worked at [employer] (no safety guidelines in place at that time)       Blood pressure 128/72, pulse 73, temperature 36.3 °C (97.3 °F), height 1.727 m (5' 8\"), weight 103 kg (227 lb).  Body mass index is 34.52 kg/m².    Physical Examination  General: NAD. Alert.   Lymph nodes: No cervical or clavicular adenopathy  Cardiovascular: Regular rate rhythm S1-S2 normal no murmur. No carotid bruit.   Lungs: Clear to Auscultation without wheezing, rales, or rhonchi, Breath sounds symmetric. No use of accessory muscles  Abdomen:  Normoactive bowel sounds, soft, non-tender  Limited exam  Extremities: No pretibial edema  Neuro: no facial asymmetry. Babinski negative.   Skin: no rash noted  Feet skin intact. DP intact. No cyanosis.     Labs 12/2024 hepatitis C, PSA, A1c, cmp, cbc    Lab Results   Component Value Date    HGBA1C 5.6 12/03/2024     Lab Results   Component Value Date    GLUCOSE 97 12/03/2024    CALCIUM 9.3 12/03/2024     12/03/2024    K 5.1 12/03/2024    CO2 29 12/03/2024     12/03/2024    BUN 16 12/03/2024    CREATININE 1.04 12/03/2024     Lab Results   Component Value Date    ALT 24 12/03/2024    " AST 22 12/03/2024    ALKPHOS 33 12/03/2024    BILITOT 0.7 12/03/2024     Lab Results   Component Value Date    WBC 6.5 12/03/2024    HGB 15.7 12/03/2024    HCT 46.8 12/03/2024     (H) 12/03/2024     12/03/2024     Lab Results   Component Value Date    CHOL 151 06/13/2024    CHOL 143 06/09/2023    CHOL 157 10/25/2022     Lab Results   Component Value Date    HDL 38.4 06/13/2024    HDL 37.9 (A) 06/09/2023    HDL 41.0 10/25/2022     Lab Results   Component Value Date    LDLCALC 75 06/13/2024     Lab Results   Component Value Date    TRIG 186 (H) 06/13/2024    TRIG 120 06/09/2023    TRIG 132 10/25/2022   06/2024 lipid, cmp, cbc      ASSESSMENT/PLAN  1. Hyperglycemia (Primary)  - Hemoglobin A1C; Future  - CBC; Future    2. Hypertension, unspecified type  Well controlled. Asymptomatic. F/up with cardiologist in 1 year and prn  Labs in 6 months and f/up here.   - Lipid Panel; Future  - Comprehensive Metabolic Panel; Future  - CBC; Future    3. Macrocytosis  Prior b12 within goal. Recheck with next labs.   - Vitamin B12; Future    Living Will: has a living will, have a copy in the chart.   Men: PSA (Age >50) 12/2024  Colon cancer screening 45 and older: defers-he will do next year  Flu vaccine up to date  Pneumonia due end of next year  Recommend rsv.         Current Outpatient Medications on File Prior to Visit   Medication Sig Dispense Refill    albuterol 2.5 mg /3 mL (0.083 %) nebulizer solution Take 3 mL (2.5 mg) by nebulization every 4 hours if needed for wheezing.      albuterol 90 mcg/actuation inhaler INHALE 1 TO 2 PUFFS BY MOUTH EVERY 4 TO 6 HOURS AS NEEDED      aspirin 81 mg EC tablet Take 1 tablet (81 mg) by mouth once daily.      EPINEPHrine (EpiPen 2-Matt) 0.3 mg/0.3 mL injection syringe Inject 0.3 mL (0.3 mg) into the muscle 1 time if needed for anaphylaxis. 1 each 3    metoprolol tartrate (Lopressor) 25 mg tablet Take 0.5 tablets (12.5 mg) by mouth 2 times a day. 180 tablet 3    oxymetazoline  (Afrin, oxymetazoline,) 0.05 % nasal spray Administer 1 spray into each nostril 2 times a day.      pravastatin (Pravachol) 20 mg tablet Take 1 tablet (20 mg) by mouth once daily at bedtime. 90 tablet 3    sildenafil (Viagra) 50 mg tablet Take 1 tablet (50 mg) by mouth. TAKE ONE TABLET BY MOUTH DAILY 1 HOUR BEFORE NEEDED      Stiolto Respimat 2.5-2.5 mcg/actuation mist inhaler Inhale 2 Inhalations once daily.      valsartan (Diovan) 160 mg tablet Take 1 tablet (160 mg) by mouth once daily. 90 tablet 0    [DISCONTINUED] cetirizine (ZyrTEC) 10 mg tablet Take 1 tablet (10 mg) by mouth once daily as needed. (Patient not taking: Reported on 12/5/2024)      [DISCONTINUED] valsartan (Diovan) 160 mg tablet TAKE 1 TABLET BY MOUTH DAILY 90 tablet 3     No current facility-administered medications on file prior to visit.

## 2024-12-05 NOTE — PROGRESS NOTES
Patient:  Oli Virk  YOB: 1950  MRN: [id number]       Chief Complaint/Active Symptoms:       Oli Virk is a 74 y.o. male who returns today for cardiac follow-up.    Patient is here for routine follow-up of hypertension. Doing well. Has no chest pain or discomfort. Has chronic dyspnea from his COPD, no orthopnea or PND. No palpitations, syncope or near syncope. No edema. Is fatigued.     Activity limited by COPD and arthritic complaints.     Review of Systems    Objective:     Vitals:    12/05/24 1348   BP: 128/72   Pulse: 73   Temp: 36.3 °C (97.4 °F)       Vitals:    12/05/24 1348   Weight: 103 kg (227 lb)       Allergies:     Allergies   Allergen Reactions    Bee Venom Protein (Honey Bee) Unknown          Medications:     Current Outpatient Medications   Medication Instructions    albuterol 90 mcg/actuation inhaler INHALE 1 TO 2 PUFFS BY MOUTH EVERY 4 TO 6 HOURS AS NEEDED    albuterol 2.5 mg, Every 4 hours PRN    aspirin 81 mg, Daily    EpiPen 2-Matt 0.3 mg, intramuscular, Once as needed    metoprolol tartrate (LOPRESSOR) 12.5 mg, oral, 2 times daily    oxymetazoline (Afrin, oxymetazoline,) 0.05 % nasal spray 1 spray, 2 times daily    pravastatin (PRAVACHOL) 20 mg, oral, Nightly    sildenafil (VIAGRA) 50 mg    Stiolto Respimat 2.5-2.5 mcg/actuation mist inhaler 2 puffs, Daily    valsartan (DIOVAN) 160 mg, oral, Daily       Physical Examination:   GENERAL:  Well developed, well nourished, in no acute distress.  HEENT: NC AT, EOMI with anicteric sclera  NECK:  Supple, no JVD, no bruit.  CHEST:  Symmetric and nontender.  LUNGS:  Nonlabored respirations, diminished at the bases with prolonged exp phase, no wheeze.   HEART:  PMI is Not palpable. RRR with normal S1 and S2, no S3, no mumur or rub. No carotid or abdominal bruits  ABDOMEN: Soft, NT, ND without palpable organomegaly or bruits  EXTREMITIES:  Warm with good color, no clubbing or cyanosis.  There is no edema noted.  PERIPHERAL  "VASCULAR:  Pulses present and equally palpable; 2+ throughout.  MUSCULOSKELETAL: OA changes  NEURO/PSYCH:  Alert and oriented times three with approppriate behavior and responses. Nonfocal motor examination with normal gait and ambulation  Lymph: No significant palpable lymphadenopathy  Skin: no rash or lesions on exposed skin or reported.    Lab:     CBC:   Lab Results   Component Value Date    WBC 6.5 12/03/2024    RBC 4.50 12/03/2024    HGB 15.7 12/03/2024    HCT 46.8 12/03/2024     12/03/2024        CMP:    Lab Results   Component Value Date     12/03/2024    K 5.1 12/03/2024     12/03/2024    CO2 29 12/03/2024    BUN 16 12/03/2024    CREATININE 1.04 12/03/2024    GLUCOSE 97 12/03/2024    CALCIUM 9.3 12/03/2024       Magnesium:    No results found for: \"MG\"    Lipid Profile:    Lab Results   Component Value Date    TRIG 186 (H) 06/13/2024    HDL 38.4 06/13/2024    LDLCALC 75 06/13/2024       TSH:    No results found for: \"TSH\"    BNP:   No results found for: \"BNP\"     PT/INR:    No results found for: \"PROTIME\", \"INR\"    HgBA1c:    Lab Results   Component Value Date    HGBA1C 5.6 12/03/2024       BMP:  Lab Results   Component Value Date     12/03/2024     06/13/2024     06/09/2023     10/25/2022     04/07/2022    K 5.1 12/03/2024    K 4.4 06/13/2024    K 4.9 06/09/2023    K 4.4 10/25/2022    K 4.5 04/07/2022     12/03/2024     06/13/2024     06/09/2023     10/25/2022     04/07/2022    CO2 29 12/03/2024    CO2 25 06/13/2024    CO2 26 06/09/2023    CO2 25 10/25/2022    CO2 26 04/07/2022    BUN 16 12/03/2024    BUN 14 06/13/2024    BUN 18 06/09/2023    BUN 16 10/25/2022    BUN 15 04/07/2022    CREATININE 1.04 12/03/2024    CREATININE 0.95 06/13/2024    CREATININE 1.02 06/09/2023    CREATININE 1.00 10/25/2022    CREATININE 1.04 04/07/2022       Cardiac Enzymes:    No results found for: \"TROPHS\"    Hepatic Function Panel:    Lab Results "   Component Value Date    ALKPHOS 33 12/03/2024    ALT 24 12/03/2024    AST 22 12/03/2024    PROT 7.1 12/03/2024    BILITOT 0.7 12/03/2024    BILIDIR 0.1 12/19/2019         Diagnostic Studies:     EKG:   EKG today NSR, RBBB, LAHB - no comparison EKG available for review.     Radiology:     No orders to display       ASSESSMENT     Problem List Items Addressed This Visit       Hypertension - Primary    Relevant Orders    ECG 12 lead (Clinic Performed) (Completed)    Follow Up In Cardiology    Shortness of breath on exertion    Relevant Orders    Follow Up In Cardiology    Right bundle branch block (RBBB) with left anterior fascicular block (LAFB)    Relevant Orders    Follow Up In Cardiology       PLAN     Hypertension. Controlled on med Rx.   SOB on exertion - by description related to COPD. No signs or symptoms of heart failure  RBBB with LAHB (Bifascicular block). Spent 10 minutes reviewing what this is with patient and to monitor for low heart rate or symptoms of syncope/near syncope. Increased risk for need of pacemaker in future. Would be cautious with beta blockers.     Will see back in follow-up in 1 year. Sooner if any problems or concerns.

## 2025-02-28 DIAGNOSIS — I10 PRIMARY HYPERTENSION: ICD-10-CM

## 2025-02-28 RX ORDER — VALSARTAN 160 MG/1
160 TABLET ORAL DAILY
Qty: 90 TABLET | Refills: 2 | Status: SHIPPED | OUTPATIENT
Start: 2025-02-28 | End: 2025-11-25

## 2025-06-12 LAB
ALBUMIN SERPL-MCNC: 4.5 G/DL (ref 3.6–5.1)
ALP SERPL-CCNC: 33 U/L (ref 35–144)
ALT SERPL-CCNC: 21 U/L (ref 9–46)
ANION GAP SERPL CALCULATED.4IONS-SCNC: 9 MMOL/L (CALC) (ref 7–17)
AST SERPL-CCNC: 21 U/L (ref 10–35)
BILIRUB SERPL-MCNC: 0.8 MG/DL (ref 0.2–1.2)
BUN SERPL-MCNC: 15 MG/DL (ref 7–25)
CALCIUM SERPL-MCNC: 9.4 MG/DL (ref 8.6–10.3)
CHLORIDE SERPL-SCNC: 103 MMOL/L (ref 98–110)
CHOLEST SERPL-MCNC: 142 MG/DL
CHOLEST/HDLC SERPL: 3.6 (CALC)
CO2 SERPL-SCNC: 26 MMOL/L (ref 20–32)
CREAT SERPL-MCNC: 1.05 MG/DL (ref 0.7–1.28)
EGFRCR SERPLBLD CKD-EPI 2021: 74 ML/MIN/1.73M2
ERYTHROCYTE [DISTWIDTH] IN BLOOD BY AUTOMATED COUNT: 13.2 % (ref 11–15)
EST. AVERAGE GLUCOSE BLD GHB EST-MCNC: 123 MG/DL
EST. AVERAGE GLUCOSE BLD GHB EST-SCNC: 6.8 MMOL/L
GLUCOSE SERPL-MCNC: 97 MG/DL (ref 65–99)
HBA1C MFR BLD: 5.9 %
HCT VFR BLD AUTO: 48.5 % (ref 38.5–50)
HDLC SERPL-MCNC: 39 MG/DL
HGB BLD-MCNC: 16.1 G/DL (ref 13.2–17.1)
LDLC SERPL CALC-MCNC: 81 MG/DL (CALC)
MCH RBC QN AUTO: 34.9 PG (ref 27–33)
MCHC RBC AUTO-ENTMCNC: 33.2 G/DL (ref 32–36)
MCV RBC AUTO: 105.2 FL (ref 80–100)
NONHDLC SERPL-MCNC: 103 MG/DL (CALC)
PLATELET # BLD AUTO: 172 THOUSAND/UL (ref 140–400)
PMV BLD REES-ECKER: 9.7 FL (ref 7.5–12.5)
POTASSIUM SERPL-SCNC: 5.1 MMOL/L (ref 3.5–5.3)
PROT SERPL-MCNC: 7.1 G/DL (ref 6.1–8.1)
RBC # BLD AUTO: 4.61 MILLION/UL (ref 4.2–5.8)
SODIUM SERPL-SCNC: 138 MMOL/L (ref 135–146)
TRIGL SERPL-MCNC: 127 MG/DL
VIT B12 SERPL-MCNC: 388 PG/ML (ref 200–1100)
WBC # BLD AUTO: 6.2 THOUSAND/UL (ref 3.8–10.8)

## 2025-06-13 ENCOUNTER — APPOINTMENT (OUTPATIENT)
Dept: PRIMARY CARE | Facility: CLINIC | Age: 75
End: 2025-06-13
Payer: MEDICARE

## 2025-06-13 VITALS
DIASTOLIC BLOOD PRESSURE: 78 MMHG | WEIGHT: 230 LBS | SYSTOLIC BLOOD PRESSURE: 122 MMHG | OXYGEN SATURATION: 90 % | HEIGHT: 68 IN | TEMPERATURE: 98.1 F | HEART RATE: 71 BPM | BODY MASS INDEX: 34.86 KG/M2

## 2025-06-13 DIAGNOSIS — D75.89 MACROCYTOSIS: ICD-10-CM

## 2025-06-13 DIAGNOSIS — Z12.5 SCREENING FOR PROSTATE CANCER: Primary | ICD-10-CM

## 2025-06-13 DIAGNOSIS — R73.9 HYPERGLYCEMIA: ICD-10-CM

## 2025-06-13 DIAGNOSIS — I10 HYPERTENSION, UNSPECIFIED TYPE: ICD-10-CM

## 2025-06-13 DIAGNOSIS — Z00.00 ROUTINE GENERAL MEDICAL EXAMINATION AT HEALTH CARE FACILITY: ICD-10-CM

## 2025-06-13 DIAGNOSIS — L72.9 SKIN CYST: ICD-10-CM

## 2025-06-13 PROCEDURE — 99397 PER PM REEVAL EST PAT 65+ YR: CPT | Performed by: INTERNAL MEDICINE

## 2025-06-13 PROCEDURE — 3078F DIAST BP <80 MM HG: CPT | Performed by: INTERNAL MEDICINE

## 2025-06-13 PROCEDURE — 1126F AMNT PAIN NOTED NONE PRSNT: CPT | Performed by: INTERNAL MEDICINE

## 2025-06-13 PROCEDURE — 1160F RVW MEDS BY RX/DR IN RCRD: CPT | Performed by: INTERNAL MEDICINE

## 2025-06-13 PROCEDURE — 1157F ADVNC CARE PLAN IN RCRD: CPT | Performed by: INTERNAL MEDICINE

## 2025-06-13 PROCEDURE — G0439 PPPS, SUBSEQ VISIT: HCPCS | Performed by: INTERNAL MEDICINE

## 2025-06-13 PROCEDURE — 3074F SYST BP LT 130 MM HG: CPT | Performed by: INTERNAL MEDICINE

## 2025-06-13 PROCEDURE — 1170F FXNL STATUS ASSESSED: CPT | Performed by: INTERNAL MEDICINE

## 2025-06-13 PROCEDURE — 1159F MED LIST DOCD IN RCRD: CPT | Performed by: INTERNAL MEDICINE

## 2025-06-13 PROCEDURE — 1036F TOBACCO NON-USER: CPT | Performed by: INTERNAL MEDICINE

## 2025-06-13 RX ORDER — METOPROLOL TARTRATE 25 MG/1
12.5 TABLET, FILM COATED ORAL 2 TIMES DAILY
Qty: 180 TABLET | Refills: 3 | Status: SHIPPED | OUTPATIENT
Start: 2025-06-13

## 2025-06-13 ASSESSMENT — PAIN SCALES - GENERAL: PAINLEVEL_OUTOF10: 0-NO PAIN

## 2025-06-13 ASSESSMENT — LIFESTYLE VARIABLES
SKIP TO QUESTIONS 9-10: 1
HOW OFTEN DO YOU HAVE SIX OR MORE DRINKS ON ONE OCCASION: NEVER
AUDIT-C TOTAL SCORE: 4
HOW MANY STANDARD DRINKS CONTAINING ALCOHOL DO YOU HAVE ON A TYPICAL DAY: 1 OR 2
HOW OFTEN DO YOU HAVE A DRINK CONTAINING ALCOHOL: 4 OR MORE TIMES A WEEK

## 2025-06-13 ASSESSMENT — ACTIVITIES OF DAILY LIVING (ADL)
MANAGING_FINANCES: INDEPENDENT
BATHING: INDEPENDENT
DOING_HOUSEWORK: INDEPENDENT
TAKING_MEDICATION: INDEPENDENT
GROCERY_SHOPPING: INDEPENDENT
DRESSING: INDEPENDENT

## 2025-06-13 NOTE — PROGRESS NOTES
"Chief Complaint   Patient presents with    Medicare Annual Wellness Visit Subsequent     Pt here for AWV       75 y.o. for AWV  Last visit 12/2024. Eats out. Eating less. Needs to eat more vegetables.   No GI or  complaints.     Past Medical History  AAA surgery 2/2018 surgeon Dr Corrales. sees Dr Cardozo  per his note mild ascending aortic aneurysm.   Bee sting allergy-carries EPI pen  Right renal pelvis: left renal cyst 12/2017 and CT 2023  Former smoker-screen Ct yearly due Sept  Hearing loss: has earing aides  HTN  Emphysema Dr Quach. pfts 4/2022 severe  Fatty liver     Social history  quit smoking age 55 >1 ppd x 40 years  , has children  worked at [employer] (no safety guidelines in place at that time)      Blood pressure 122/78, pulse 71, temperature 36.7 °C (98.1 °F), height 1.727 m (5' 8\"), weight 104 kg (230 lb), SpO2 90%.  Body mass index is 34.97 kg/m².    Physical Examination  General: NAD. Alert.   HEENT: Normocephalic atraumatic.    Eyes: no scleral icterus. Extraocular movements intact.  Pupils equal round and reactive to light.  Ears: TM intact.  No cerumen. Hearing decreased  Throat: No exudate  Neck:  Supple. No thyroid goiter.  Lymph nodes: No cervical or clavicular adenopathy  Cardiovascular: Regular rate rhythm S1-S2 normal no murmur. No carotid bruit.   Lungs: Clear to Auscultation without wheezing, rales, or rhonchi, Breath sounds symmetric. No use of accessory muscles  Abdomen:  Normoactive bowel sounds, soft, non-tender. limited  Extremities: No pretibial edema  Neuro: no facial asymmetry. Strength upper and lower extremities 5/5. Sensation intact to light touch. No tremor. Babinski negative.  CN III -XII grossly intact except decreased hearing  Skin: no rash noted. +2 cysts back.   Vascular: DP pulses intact. No cyanosis    Labs 06/2025 A1c, b12, cbc, cmp, lipid    Lab Results   Component Value Date    HGBA1C 5.9 (H) 06/11/2025     Lab Results   Component Value Date    " GLUCOSE 97 06/11/2025    CALCIUM 9.4 06/11/2025     06/11/2025    K 5.1 06/11/2025    CO2 26 06/11/2025     06/11/2025    BUN 15 06/11/2025    CREATININE 1.05 06/11/2025     Lab Results   Component Value Date    ALT 21 06/11/2025    AST 21 06/11/2025    ALKPHOS 33 (L) 06/11/2025    BILITOT 0.8 06/11/2025     Lab Results   Component Value Date    WBC 6.2 06/11/2025    HGB 16.1 06/11/2025    HCT 48.5 06/11/2025    .2 (H) 06/11/2025     06/11/2025     Lab Results   Component Value Date    CHOL 142 06/11/2025    CHOL 151 06/13/2024    CHOL 143 06/09/2023     Lab Results   Component Value Date    HDL 39 (L) 06/11/2025    HDL 38.4 06/13/2024    HDL 37.9 (A) 06/09/2023     Lab Results   Component Value Date    LDLCALC 81 06/11/2025    LDLCALC 75 06/13/2024     Lab Results   Component Value Date    TRIG 127 06/11/2025    TRIG 186 (H) 06/13/2024    TRIG 120 06/09/2023 12/2024 lipid, hep C, PSA, A1c, CMP, CBC      ASSESSMENT/PLAN  AWV  Living Will: has a living will, copy in chart.   Cognition/Memory if 65 or above  Hepatitis C screen (18-79) done 12/03/2024  HIV screen(18-64, once)   PSA: 12/2024  Colon cancer screening 45 and older: decided to not do now, he will think about it and may discuss again next year.   Immunization:  recommend tdap at pharmacy    If Smoker/Former smoker:  US aorta (age 65-75):  done 11/19/2023  Age 50-75, 20 pack year history, quit within 15 years or currently smoking  (medicare 55-77, 30 pack year) n/a, quit age 55. Stopped smoking 20 years ago  1. Screening for prostate cancer (Primary)  - Prostate Specific Antigen, Screen; Future    2. Hypertension, unspecified type  Bp well controlled. Asymptomatic.   - Comprehensive Metabolic Panel; Future  - CBC; Future  - Lipid Panel; Future  - metoprolol tartrate (Lopressor) 25 mg tablet; Take 0.5 tablets (12.5 mg) by mouth 2 times a day.  Dispense: 180 tablet; Refill: 3    3. Hyperglycemia  Discussed A1c. Not very active  because of copd. Working on dietary changes.   - Comprehensive Metabolic Panel; Future  - CBC; Future  - Hemoglobin A1C; Future    4. Macrocytosis  Recent b12 within goal  - Vitamin B12; Future  - Vitamin B12    5. Skin cyst  2 cysts on his back that he would like removed.   - Referral to General Surgery; Future          Medications Ordered Prior to Encounter[1]         [1]   Current Outpatient Medications on File Prior to Visit   Medication Sig Dispense Refill    albuterol 2.5 mg /3 mL (0.083 %) nebulizer solution Take 3 mL (2.5 mg) by nebulization every 4 hours if needed for wheezing.      albuterol 90 mcg/actuation inhaler INHALE 1 TO 2 PUFFS BY MOUTH EVERY 4 TO 6 HOURS AS NEEDED      aspirin 81 mg EC tablet Take 1 tablet (81 mg) by mouth once daily.      EPINEPHrine (EpiPen 2-Matt) 0.3 mg/0.3 mL injection syringe Inject 0.3 mL (0.3 mg) into the muscle 1 time if needed for anaphylaxis. 1 each 3    metoprolol tartrate (Lopressor) 25 mg tablet Take 0.5 tablets (12.5 mg) by mouth 2 times a day. 180 tablet 3    oxymetazoline (Afrin, oxymetazoline,) 0.05 % nasal spray Administer 1 spray into each nostril 2 times a day.      pravastatin (Pravachol) 20 mg tablet Take 1 tablet (20 mg) by mouth once daily at bedtime. 90 tablet 3    sildenafil (Viagra) 50 mg tablet Take 1 tablet (50 mg) by mouth. TAKE ONE TABLET BY MOUTH DAILY 1 HOUR BEFORE NEEDED      Stiolto Respimat 2.5-2.5 mcg/actuation mist inhaler Inhale 2 Inhalations once daily.      valsartan (Diovan) 160 mg tablet Take 1 tablet (160 mg) by mouth once daily. 90 tablet 2     No current facility-administered medications on file prior to visit.

## 2025-07-07 ENCOUNTER — APPOINTMENT (OUTPATIENT)
Dept: SURGERY | Facility: CLINIC | Age: 75
End: 2025-07-07
Payer: MEDICARE

## 2025-07-07 DIAGNOSIS — L72.9 SKIN CYST: Primary | ICD-10-CM

## 2025-07-07 PROCEDURE — 1160F RVW MEDS BY RX/DR IN RCRD: CPT | Performed by: SURGERY

## 2025-07-07 PROCEDURE — 99203 OFFICE O/P NEW LOW 30 MIN: CPT | Performed by: SURGERY

## 2025-07-07 PROCEDURE — 1159F MED LIST DOCD IN RCRD: CPT | Performed by: SURGERY

## 2025-07-07 PROCEDURE — 1036F TOBACCO NON-USER: CPT | Performed by: SURGERY

## 2025-07-07 NOTE — PROGRESS NOTES
"Subjective   Patient ID: Oli Virk \"Mac" is a 75 y.o. male who presents for but enlarging skin cyst on the upper back.  X 2.  Also patient complaints and the palpable tender skin lesion on the right neck    HPI as described above.  Review of Systems integumentary consistent with palpable skin cysts on the back.  Review of all other 10 system is negative  Physical Exam Pupils equal bilaterally, oral mucosa moist, bilateral breath sounds, clear to auscultation, regular rhythm and rate, no murmurs, abdomen is soft, nontender, nondistended scar from previous AAA repair, no palpable hernias, palpable peripheral pulse, no focal neurological motor deficits.  ENT exam within normal limits.  Musculoskeletal exam within normal limits.    There is a palpable 2 sebaceous skin cyst on the upper back.  High risk of infection.  Also palpable right neck skin lesion with underlying soft tissue mass      Objective   I reviewed all available data including lab results, radiological studies, previous reports and notes.  No diagnosis found.   Problem List[1]   RX Allergies[2]   Medication Documentation Review Audit       Reviewed by Randal Granda MD (Physician) on 07/07/25 at 1523      Medication Order Taking? Sig Documenting Provider Last Dose Status   albuterol 2.5 mg /3 mL (0.083 %) nebulizer solution [id number] No Take 3 mL (2.5 mg) by nebulization every 4 hours if needed for wheezing. Historical Provider, MD Taking Active   albuterol 90 mcg/actuation inhaler [id number] No INHALE 1 TO 2 PUFFS BY MOUTH EVERY 4 TO 6 HOURS AS NEEDED Historical Provider, MD Taking Active   aspirin 81 mg EC tablet [id number]  Take 1 tablet (81 mg) by mouth once daily. Historical Provider, MD  Active   EPINEPHrine (EpiPen 2-Matt) 0.3 mg/0.3 mL injection syringe [id number] No Inject 0.3 mL (0.3 mg) into the muscle 1 time if needed for anaphylaxis. Edith Perdue MD Taking Active   metoprolol tartrate (Lopressor) 25 mg tablet [id number]  Take 0.5 " tablets (12.5 mg) by mouth 2 times a day. Edith Perdue MD  Active   oxymetazoline (Afrin, oxymetazoline,) 0.05 % nasal spray [id number] No Administer 1 spray into each nostril 2 times a day. Historical Provider, MD Taking Active   pravastatin (Pravachol) 20 mg tablet [id number]  Take 1 tablet (20 mg) by mouth once daily at bedtime. Suzanne Cardozo MD  Active   sildenafil (Viagra) 50 mg tablet [id number] No Take 1 tablet (50 mg) by mouth. TAKE ONE TABLET BY MOUTH DAILY 1 HOUR BEFORE NEEDED Historical Provider, MD Taking Active   Stiolto Respimat 2.5-2.5 mcg/actuation mist inhaler [id number] No Inhale 2 Inhalations once daily. Historical Provider, MD Taking Active   valsartan (Diovan) 160 mg tablet [id number]  Take 1 tablet (160 mg) by mouth once daily. Suzanne Cardozo MD  Active                    Medical History[3]  Tobacco Use History[4]  Family History[5]   Surgical History[6]    Assessment/Plan     The patient with enlarging upper back cyst, high risk of infection.  The patient has indication for complete excision of the cyst with skin defect repair with a adjacent tissue rearrangement.  Excision of the right neck skin lesion with underlying mass will be done as a separate case after complete recovery after first surgery.    Randal Granda MD          [1]   Patient Active Problem List  Diagnosis    Abdominal aortic aneurysm (AAA) without rupture    Chronic obstructive pulmonary disease (Multi)    Hyperglycemia    Hypertension    Macrocytosis    Nocturia    Shortness of breath on exertion    Fatty liver    Obesity, morbid (Multi)    Right bundle branch block (RBBB) with left anterior fascicular block (LAFB)   [2]   Allergies  Allergen Reactions    Bee Venom Protein (Honey Bee) Unknown   [3]   Past Medical History:  Diagnosis Date    Allergic     Cataract     Emphysema of lung (Multi)     HL (hearing loss)     Unspecified asthma, uncomplicated ([facility]) 03/10/2017    Acute asthmatic bronchitis    Visual  impairment    [4]   Social History  Tobacco Use   Smoking Status Former    Current packs/day: 0.00    Average packs/day: 1 pack/day for 80.0 years (80.0 ttl pk-yrs)    Types: Cigarettes    Start date: 1965    Quit date: 2005    Years since quittin.4    Passive exposure: Past   Smokeless Tobacco Never   [5]   Family History  Problem Relation Name Age of Onset    Alcohol abuse Brother  20 - 29   [6]   Past Surgical History:  Procedure Laterality Date    COLONOSCOPY  2017    Complete Colonoscopy    CT ABDOMEN ANGIOGRAM W AND/OR WO IV CONTRAST  2023    CT ABDOMEN ANGIOGRAM W AND/OR WO IV CONTRAST    OTHER SURGICAL HISTORY  09/10/2018    Abdominal Aorta Dissection Repair    OTHER SURGICAL HISTORY  10/14/2021    Cataract surgery    PILONIDAL CYST DRAINAGE  2014    Pilonidal Cyst Resection    TONSILLECTOMY  02/10/2015    Tonsillectomy

## 2025-07-18 ENCOUNTER — TELEPHONE (OUTPATIENT)
Dept: SURGERY | Facility: CLINIC | Age: 75
End: 2025-07-18
Payer: MEDICARE

## 2025-07-18 NOTE — TELEPHONE ENCOUNTER
Pt returned call, results provided. Pt verbalized understanding, and declined a follow up appt. Pt stated his incision was healing with no complications.  Indicated to follow up as needed.

## 2025-08-21 DIAGNOSIS — I71.40 ABDOMINAL AORTIC ANEURYSM (AAA) WITHOUT RUPTURE, UNSPECIFIED PART: ICD-10-CM

## 2025-08-21 RX ORDER — PRAVASTATIN SODIUM 20 MG/1
20 TABLET ORAL DAILY
Qty: 90 TABLET | Refills: 1 | Status: SHIPPED | OUTPATIENT
Start: 2025-08-21 | End: 2026-02-17

## 2025-12-11 ENCOUNTER — APPOINTMENT (OUTPATIENT)
Dept: CARDIOLOGY | Facility: CLINIC | Age: 75
End: 2025-12-11
Payer: MEDICARE

## 2026-01-14 ENCOUNTER — APPOINTMENT (OUTPATIENT)
Dept: PRIMARY CARE | Facility: CLINIC | Age: 76
End: 2026-01-14
Payer: MEDICARE